# Patient Record
Sex: MALE | Race: WHITE | NOT HISPANIC OR LATINO | Employment: UNEMPLOYED
[De-identification: names, ages, dates, MRNs, and addresses within clinical notes are randomized per-mention and may not be internally consistent; named-entity substitution may affect disease eponyms.]

---

## 2017-11-10 ENCOUNTER — RECORDS - HEALTHEAST (OUTPATIENT)
Dept: ADMINISTRATIVE | Facility: OTHER | Age: 22
End: 2017-11-10

## 2017-11-13 ENCOUNTER — RECORDS - HEALTHEAST (OUTPATIENT)
Dept: ADMINISTRATIVE | Facility: OTHER | Age: 22
End: 2017-11-13

## 2017-11-20 ENCOUNTER — RECORDS - HEALTHEAST (OUTPATIENT)
Dept: ADMINISTRATIVE | Facility: OTHER | Age: 22
End: 2017-11-20

## 2017-11-22 ENCOUNTER — OFFICE VISIT - HEALTHEAST (OUTPATIENT)
Dept: INTERNAL MEDICINE | Facility: CLINIC | Age: 22
End: 2017-11-22

## 2017-11-22 DIAGNOSIS — R33.9 INCOMPLETE EMPTYING OF BLADDER: ICD-10-CM

## 2017-11-22 DIAGNOSIS — F34.1 DYSTHYMIC DISORDER: ICD-10-CM

## 2017-11-22 DIAGNOSIS — R79.89 ABNORMAL LFTS: ICD-10-CM

## 2017-11-22 DIAGNOSIS — E55.9 VITAMIN D DEFICIENCY: ICD-10-CM

## 2017-11-22 DIAGNOSIS — N32.9: ICD-10-CM

## 2017-11-22 ASSESSMENT — MIFFLIN-ST. JEOR: SCORE: 1687.99

## 2017-11-23 LAB — ANA SER QL: 0.3 U

## 2017-11-24 ENCOUNTER — COMMUNICATION - HEALTHEAST (OUTPATIENT)
Dept: INTERNAL MEDICINE | Facility: CLINIC | Age: 22
End: 2017-11-24

## 2017-11-24 DIAGNOSIS — R79.89 ABNORMAL LFTS: ICD-10-CM

## 2017-11-24 LAB — HEPATITIS B SURFACE ANTIBODY LHE- HISTORICAL: NEGATIVE

## 2017-11-27 ENCOUNTER — COMMUNICATION - HEALTHEAST (OUTPATIENT)
Dept: INTERNAL MEDICINE | Facility: CLINIC | Age: 22
End: 2017-11-27

## 2017-11-27 DIAGNOSIS — R79.89 ABNORMAL LFTS: ICD-10-CM

## 2017-11-29 ENCOUNTER — RECORDS - HEALTHEAST (OUTPATIENT)
Dept: ADMINISTRATIVE | Facility: OTHER | Age: 22
End: 2017-11-29

## 2017-12-06 ENCOUNTER — RECORDS - HEALTHEAST (OUTPATIENT)
Dept: ADMINISTRATIVE | Facility: OTHER | Age: 22
End: 2017-12-06

## 2017-12-13 ENCOUNTER — RECORDS - HEALTHEAST (OUTPATIENT)
Dept: ADMINISTRATIVE | Facility: OTHER | Age: 22
End: 2017-12-13

## 2017-12-27 ENCOUNTER — RECORDS - HEALTHEAST (OUTPATIENT)
Dept: ADMINISTRATIVE | Facility: OTHER | Age: 22
End: 2017-12-27

## 2018-01-17 ENCOUNTER — RECORDS - HEALTHEAST (OUTPATIENT)
Dept: ADMINISTRATIVE | Facility: OTHER | Age: 23
End: 2018-01-17

## 2018-01-31 ENCOUNTER — RECORDS - HEALTHEAST (OUTPATIENT)
Dept: ADMINISTRATIVE | Facility: OTHER | Age: 23
End: 2018-01-31

## 2018-04-29 ENCOUNTER — RECORDS - HEALTHEAST (OUTPATIENT)
Dept: ADMINISTRATIVE | Facility: OTHER | Age: 23
End: 2018-04-29

## 2018-04-29 ENCOUNTER — HOSPITAL ENCOUNTER (INPATIENT)
Facility: CLINIC | Age: 23
LOS: 1 days | Discharge: HOME | DRG: 885 | End: 2018-05-01
Attending: EMERGENCY MEDICINE | Admitting: PSYCHIATRY & NEUROLOGY
Payer: COMMERCIAL

## 2018-04-29 ENCOUNTER — APPOINTMENT (OUTPATIENT)
Dept: CT IMAGING | Facility: CLINIC | Age: 23
DRG: 885 | End: 2018-04-29
Attending: EMERGENCY MEDICINE
Payer: COMMERCIAL

## 2018-04-29 DIAGNOSIS — R41.82 ALTERED MENTAL STATUS, UNSPECIFIED ALTERED MENTAL STATUS TYPE: ICD-10-CM

## 2018-04-29 DIAGNOSIS — F10.129 HANGOVER (H): ICD-10-CM

## 2018-04-29 DIAGNOSIS — F10.920 ALCOHOLIC INTOXICATION WITHOUT COMPLICATION (H): ICD-10-CM

## 2018-04-29 LAB
ALBUMIN SERPL-MCNC: 4.3 G/DL (ref 3.4–5)
ALBUMIN UR-MCNC: NEGATIVE MG/DL
ALP SERPL-CCNC: 78 U/L (ref 40–150)
ALT SERPL W P-5'-P-CCNC: 24 U/L (ref 0–70)
AMPHETAMINES UR QL SCN: NEGATIVE
ANION GAP SERPL CALCULATED.3IONS-SCNC: 14 MMOL/L (ref 3–14)
APPEARANCE UR: CLEAR
AST SERPL W P-5'-P-CCNC: 26 U/L (ref 0–45)
BARBITURATES UR QL: NEGATIVE
BASOPHILS # BLD AUTO: 0 10E9/L (ref 0–0.2)
BASOPHILS NFR BLD AUTO: 0.1 %
BENZODIAZ UR QL: NEGATIVE
BILIRUB SERPL-MCNC: 0.4 MG/DL (ref 0.2–1.3)
BILIRUB UR QL STRIP: NEGATIVE
BUN SERPL-MCNC: 8 MG/DL (ref 7–30)
CALCIUM SERPL-MCNC: 8.4 MG/DL (ref 8.5–10.1)
CANNABINOIDS UR QL SCN: NEGATIVE
CHLORIDE SERPL-SCNC: 103 MMOL/L (ref 94–109)
CO2 SERPL-SCNC: 21 MMOL/L (ref 20–32)
COCAINE UR QL: NEGATIVE
COLOR UR AUTO: ABNORMAL
CREAT SERPL-MCNC: 0.96 MG/DL (ref 0.66–1.25)
DIFFERENTIAL METHOD BLD: NORMAL
EOSINOPHIL # BLD AUTO: 0.1 10E9/L (ref 0–0.7)
EOSINOPHIL NFR BLD AUTO: 0.7 %
ERYTHROCYTE [DISTWIDTH] IN BLOOD BY AUTOMATED COUNT: 12.8 % (ref 10–15)
ETHANOL SERPL-MCNC: 0.14 G/DL
ETHANOL UR QL SCN: POSITIVE
GFR SERPL CREATININE-BSD FRML MDRD: >90 ML/MIN/1.7M2
GLUCOSE SERPL-MCNC: 87 MG/DL (ref 70–99)
GLUCOSE UR STRIP-MCNC: NEGATIVE MG/DL
HCT VFR BLD AUTO: 42.7 % (ref 40–53)
HGB BLD-MCNC: 15.1 G/DL (ref 13.3–17.7)
HGB UR QL STRIP: NEGATIVE
IMM GRANULOCYTES # BLD: 0 10E9/L (ref 0–0.4)
IMM GRANULOCYTES NFR BLD: 0.3 %
KETONES UR STRIP-MCNC: NEGATIVE MG/DL
LEUKOCYTE ESTERASE UR QL STRIP: NEGATIVE
LYMPHOCYTES # BLD AUTO: 1.6 10E9/L (ref 0.8–5.3)
LYMPHOCYTES NFR BLD AUTO: 21.4 %
MCH RBC QN AUTO: 30.8 PG (ref 26.5–33)
MCHC RBC AUTO-ENTMCNC: 35.4 G/DL (ref 31.5–36.5)
MCV RBC AUTO: 87 FL (ref 78–100)
MONOCYTES # BLD AUTO: 0.7 10E9/L (ref 0–1.3)
MONOCYTES NFR BLD AUTO: 9.4 %
NEUTROPHILS # BLD AUTO: 5 10E9/L (ref 1.6–8.3)
NEUTROPHILS NFR BLD AUTO: 68.1 %
NITRATE UR QL: NEGATIVE
NRBC # BLD AUTO: 0 10*3/UL
NRBC BLD AUTO-RTO: 0 /100
OPIATES UR QL SCN: NEGATIVE
PH UR STRIP: 5.5 PH (ref 5–7)
PLATELET # BLD AUTO: 221 10E9/L (ref 150–450)
POTASSIUM SERPL-SCNC: 3.8 MMOL/L (ref 3.4–5.3)
PROT SERPL-MCNC: 7.8 G/DL (ref 6.8–8.8)
RBC # BLD AUTO: 4.91 10E12/L (ref 4.4–5.9)
SODIUM SERPL-SCNC: 138 MMOL/L (ref 133–144)
SOURCE: ABNORMAL
SP GR UR STRIP: 1 (ref 1–1.03)
UROBILINOGEN UR STRIP-MCNC: NORMAL MG/DL (ref 0–2)
WBC # BLD AUTO: 7.3 10E9/L (ref 4–11)

## 2018-04-29 PROCEDURE — 99284 EMERGENCY DEPT VISIT MOD MDM: CPT | Mod: Z6 | Performed by: EMERGENCY MEDICINE

## 2018-04-29 PROCEDURE — 80307 DRUG TEST PRSMV CHEM ANLYZR: CPT | Performed by: FAMILY MEDICINE

## 2018-04-29 PROCEDURE — 81003 URINALYSIS AUTO W/O SCOPE: CPT | Performed by: EMERGENCY MEDICINE

## 2018-04-29 PROCEDURE — 70450 CT HEAD/BRAIN W/O DYE: CPT

## 2018-04-29 PROCEDURE — 99285 EMERGENCY DEPT VISIT HI MDM: CPT | Mod: 25

## 2018-04-29 PROCEDURE — 90791 PSYCH DIAGNOSTIC EVALUATION: CPT

## 2018-04-29 PROCEDURE — 80320 DRUG SCREEN QUANTALCOHOLS: CPT | Performed by: EMERGENCY MEDICINE

## 2018-04-29 PROCEDURE — 80320 DRUG SCREEN QUANTALCOHOLS: CPT | Performed by: FAMILY MEDICINE

## 2018-04-29 PROCEDURE — 80053 COMPREHEN METABOLIC PANEL: CPT | Performed by: EMERGENCY MEDICINE

## 2018-04-29 PROCEDURE — 85025 COMPLETE CBC W/AUTO DIFF WBC: CPT | Performed by: EMERGENCY MEDICINE

## 2018-04-29 ASSESSMENT — ENCOUNTER SYMPTOMS
VOMITING: 0
SHORTNESS OF BREATH: 0
ABDOMINAL PAIN: 0

## 2018-04-29 NOTE — IP AVS SNAPSHOT
[address] [address]    Phone:  [phone]                                       After Visit Summary   4/29/2018    Fish Dickerson    MRN: [id number]           After Visit Summary Signature Page     I have received my discharge instructions, and my questions have been answered. I have discussed any challenges I see with this plan with the nurse or doctor.    ..........................................................................................................................................  Patient/Patient Representative Signature      ..........................................................................................................................................  Patient Representative Print Name and Relationship to Patient    ..................................................               ................................................  Date                                            Time    ..........................................................................................................................................  Reviewed by Signature/Title    ...................................................              ..............................................  Date                                                            Time

## 2018-04-29 NOTE — IP AVS SNAPSHOT
MRN:[id number]                      After Visit Summary   4/29/2018    Fish Dickerson    MRN: [id number]           Thank you!     Thank you for choosing [facility] for your care. Our goal is always to provide you with excellent care.        Patient Information     Date Of Birth          1995        Designated Caregiver       Most Recent Value    Caregiver    Will someone help with your care after discharge? yes    [place] [facility] [place]      About your hospital stay     You were admitted on:  April 30, 2018 You last received care in the:   [facility]    You were discharged on:  May 1, 2018       Who to Call     For medical emergencies, please call 911.  For non-urgent questions about your medical care, please call your primary care provider or clinic, [phone]          Attending Provider     Provider Specialty    Adrian Dey MD Emergency Medicine    Morris, Octavio Le MD Psychiatry    [facility], Billy Aiken MD Psychiatry    Hector, Bridger Burch MD Psychiatry       Primary Care Provider Office Phone # Fax #    Kathryn Santo [phone]      Further instructions from your care team        Behavioral Discharge Planning and Instructions      Summary:  You were admitted on 4/29/2018  due to abuse of DXM and alcohol.  You were treated by Dr Bridger Young MD and discharged on May 2, 2018 [facility]    Principal Diagnosis:     Health Care Follow-up Appointments:   Psychiatrist Bijal Castro - Next Appointment on Wed May 9th at 10am  [address] [address]  [phone]  [phone]    DBT Therapy at [facility] ([facility])    You plan to get a chemical dependency assessment on your own and re-enter a treatment program.                   Attend all scheduled appointments with your outpatient providers. Call at least 24 hours in advance  "if you need to reschedule an appointment to ensure continued access to your outpatient providers.   Major Treatments, Procedures and Findings:  You were provided with: a psychiatric assessment, assessed for medical stability, medication evaluation and/or management, group therapy and milieu management    Symptoms to Report: feeling more aggressive, increased confusion, losing more sleep, mood getting worse or thoughts of suicide    Early warning signs can include: increased depression or anxiety sleep disturbances increased thoughts or behaviors of suicide or self-harm     Safety and Wellness:  Take all medicines as directed.  Make no changes unless your doctor suggests them.      Follow treatment recommendations.  Refrain from alcohol and non-prescribed drugs.  If there is a concern for safety, call 911.    Resources:   Crisis Intervention: 128.138.9798 or [phone] (TTY: 870.261.9638).  Call anytime for help.  [place] on Mental Illness (www.mn.jey.org): 974.177.5801 or 673-971-1143.  Alcoholics Anonymous (www.alcoholics-anonymous.org): Check your phone book for your local chapter.    The treatment team has appreciated the opportunity to work with you.     If you have any questions or concerns our unit number is [phone].        Pending Results     No orders found from 4/27/2018 to 4/30/2018.            Admission Information     Date & Time Provider Department Dept. Phone    4/29/2018 Bridger Young MD [facility] [phone]      Your Vitals Were     Blood Pressure Pulse Temperature Respirations Height Weight    116/56 (BP Location: Left arm) 73 97.3  F (36.3  C) (Oral) 16 1.803 m (5' 11\") 64.4 kg (142 lb)    Pulse Oximetry BMI (Body Mass Index)                97% 19.8 kg/m2          MyChart Information     [employer] lets you send messages to your doctor, view your test results, renew your prescriptions, schedule appointments and more. To sign up, go to [email or web address] . Click on \"Log " "in\" on the left side of the screen, which will take you to the Welcome page. Then click on \"Sign up Now\" on the right side of the page.     You will be asked to enter the access code listed below, as well as some personal information. Please follow the directions to create your username and password.     Your access code is: [email or web address]  Expires: 2018  1:02 PM     Your access code will  in 90 days. If you need help or a new code, please call your [place] clinic or [phone].        Care EveryWhere ID     This is your Care EveryWhere ID. This could be used by other organizations to access your [place] medical records  [id number]        Equal Access to Services     RENNY GOLD : Barrera Tracey, wayash mendes, qacaleb kaalmamily blackburn, sylvia gonzalez . So [facility] [phone].    ATENCIÓN: Si habla español, tiene a montez disposición servicios gratuitos de asistencia lingüística. Llame al [phone].    We comply with applicable federal civil rights laws and [place] laws. We do not discriminate on the basis of race, color, national origin, age, disability, sex, sexual orientation, or gender identity.               Review of your medicines      CONTINUE these medicines which have NOT CHANGED        Dose / Directions    AMBIEN PO        Dose:  10 mg   Take 10 mg by mouth At Bedtime   Refills:  0       PROZAC PO        Dose:  40 mg   Take 40 mg by mouth daily   Refills:  0       * SEROQUEL PO        Dose:  300 mg   Take 300 mg by mouth At Bedtime   Refills:  0       * SEROQUEL PO        Dose:  200 mg   Take 200 mg by mouth 2 times daily B&C reports that pt takes it in the AM and 1200.   Refills:  0       * Notice:  This list has 2 medication(s) that are the same as other medications prescribed for you. Read the directions carefully, and ask your doctor or other care provider to review them with you.             Protect others around you: Learn how to safely use, " store and throw away your medicines at www.disposemymeds.org.             Medication List: This is a list of all your medications and when to take them. Check marks below indicate your daily home schedule. Keep this list as a reference.      Medications           Morning Afternoon Evening Bedtime As Needed    AMBIEN PO   Take 10 mg by mouth At Bedtime   Last time this was given:  10 mg on 4/30/2018  9:18 PM                                PROZAC PO   Take 40 mg by mouth daily   Last time this was given:  40 mg on 5/1/2018  9:35 AM                                * SEROQUEL PO   Take 300 mg by mouth At Bedtime   Last time this was given:  200 mg on 5/1/2018 11:49 AM                                * SEROQUEL PO   Take 200 mg by mouth 2 times daily B&C reports that pt takes it in the AM and 1200.   Last time this was given:  200 mg on 5/1/2018 11:49 AM                                * Notice:  This list has 2 medication(s) that are the same as other medications prescribed for you. Read the directions carefully, and ask your doctor or other care provider to review them with you.

## 2018-04-29 NOTE — ED NOTES
Bed: ED14  Expected date: 4/29/18  Expected time: 6:00 PM  Means of arrival:   Comments:  Tye [address]  22y M  Psych, AMS
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
## 2018-04-30 PROBLEM — R41.89 DISORGANIZED THOUGHT PROCESS: Status: ACTIVE | Noted: 2018-04-30

## 2018-04-30 PROCEDURE — 25000132 ZZH RX MED GY IP 250 OP 250 PS 637: Performed by: EMERGENCY MEDICINE

## 2018-04-30 PROCEDURE — 99222 1ST HOSP IP/OBS MODERATE 55: CPT | Mod: AI | Performed by: PSYCHIATRY & NEUROLOGY

## 2018-04-30 PROCEDURE — 12400007 ZZH R&B MH INTERMEDIATE UMMC

## 2018-04-30 PROCEDURE — 99207 ZZC CDG-MDM COMPONENT: MEETS LOW - DOWN CODED: CPT | Performed by: PSYCHIATRY & NEUROLOGY

## 2018-04-30 PROCEDURE — 25000132 ZZH RX MED GY IP 250 OP 250 PS 637: Performed by: PSYCHIATRY & NEUROLOGY

## 2018-04-30 PROCEDURE — HZ2ZZZZ DETOXIFICATION SERVICES FOR SUBSTANCE ABUSE TREATMENT: ICD-10-PCS | Performed by: OBSTETRICS & GYNECOLOGY

## 2018-04-30 RX ORDER — QUETIAPINE FUMARATE 200 MG/1
200 TABLET, FILM COATED ORAL 2 TIMES DAILY
Status: DISCONTINUED | OUTPATIENT
Start: 2018-04-30 | End: 2018-05-01 | Stop reason: HOSPADM

## 2018-04-30 RX ORDER — ZOLPIDEM TARTRATE 10 MG/1
10 TABLET ORAL AT BEDTIME
Status: DISCONTINUED | OUTPATIENT
Start: 2018-04-30 | End: 2018-05-01 | Stop reason: HOSPADM

## 2018-04-30 RX ORDER — HYDROXYZINE HYDROCHLORIDE 50 MG/1
50 TABLET, FILM COATED ORAL EVERY 4 HOURS PRN
Status: DISCONTINUED | OUTPATIENT
Start: 2018-04-30 | End: 2018-05-01 | Stop reason: HOSPADM

## 2018-04-30 RX ORDER — ATENOLOL 25 MG/1
50 TABLET ORAL DAILY PRN
Status: DISCONTINUED | OUTPATIENT
Start: 2018-04-30 | End: 2018-05-01 | Stop reason: HOSPADM

## 2018-04-30 RX ORDER — LORAZEPAM 1 MG/1
1-4 TABLET ORAL EVERY 30 MIN PRN
Status: DISCONTINUED | OUTPATIENT
Start: 2018-04-30 | End: 2018-05-01 | Stop reason: CLARIF

## 2018-04-30 RX ORDER — QUETIAPINE FUMARATE 300 MG/1
300 TABLET, FILM COATED ORAL AT BEDTIME
Status: DISCONTINUED | OUTPATIENT
Start: 2018-04-30 | End: 2018-05-01 | Stop reason: HOSPADM

## 2018-04-30 RX ORDER — OLANZAPINE 10 MG/1
10 TABLET, ORALLY DISINTEGRATING ORAL ONCE
Status: COMPLETED | OUTPATIENT
Start: 2018-04-30 | End: 2018-04-30

## 2018-04-30 RX ORDER — HYDROXYZINE HYDROCHLORIDE 25 MG/1
25 TABLET, FILM COATED ORAL EVERY 4 HOURS PRN
Status: DISCONTINUED | OUTPATIENT
Start: 2018-04-30 | End: 2018-04-30

## 2018-04-30 RX ADMIN — FLUOXETINE 40 MG: 20 CAPSULE ORAL at 12:55

## 2018-04-30 RX ADMIN — OLANZAPINE 10 MG: 10 TABLET, ORALLY DISINTEGRATING ORAL at 02:25

## 2018-04-30 RX ADMIN — HYDROXYZINE HYDROCHLORIDE 25 MG: 25 TABLET, FILM COATED ORAL at 09:58

## 2018-04-30 RX ADMIN — ZOLPIDEM TARTRATE 10 MG: 10 TABLET, FILM COATED ORAL at 21:18

## 2018-04-30 RX ADMIN — QUETIAPINE FUMARATE 300 MG: 300 TABLET ORAL at 21:18

## 2018-04-30 RX ADMIN — QUETIAPINE FUMARATE 200 MG: 200 TABLET ORAL at 12:56

## 2018-04-30 NOTE — PROGRESS NOTES
Pt has not attended scheduled occupational therapy sessions. Encourage attendance and participation. Pt will be given self-assessment form, and OT staff will explain the purpose of including them in their treatment plan and offer options for meeting their needs.
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
## 2018-04-30 NOTE — PROGRESS NOTES
04/30/18 0157   Patient Belongings   Did you bring any home meds/supplements to the hospital?  No   Patient Belongings clothing;cell phone/electronics;glasses;shoes;wallet;other (see comments)   Disposition of Belongings Locker   Belongings Search Yes   Clothing Search Yes   Second Staff Jay Jay   General Info Comment Patient came in with black boot, belt, a lock on a chain, sock, black pant, black shirt, black jacket, cell phone+ ear phones, sun glasses, wallet with ID and pin bottons.  There was no cash and no money cards in belongings.   A               Admission:  I am responsible for any personal items that are not sent to the safe or pharmacy.  [facility] is not responsible for loss, theft or damage of any property in my possession.    Signature:  _________________________________ Date: _______  Time: _____                                              Staff Signature:  ____________________________ Date: ________  Time: _____      2nd Staff person, if patient is unable/unwilling to sign:    Signature: ________________________________ Date: ________  Time: _____     Discharge:  [facility] has returned all of my personal belongings:    Signature: _________________________________ Date: ________  Time: _____                                          Staff Signature:  ____________________________ Date: ________  Time: _____
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
## 2018-04-30 NOTE — ED NOTES
1857 Pt had heavy chain necklace secured with padlock and did not have a key.  Decision made by MD Dey that necklace must be removed for pt safety.  Attempt made to remove with pliers but was not successful.  Code 21 called to remove chain with bolt cutters.  Team arrived, but with assistance of psych associate, padlock was disabled. Code team left, pt remained calm throughout process.
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
## 2018-04-30 NOTE — PROGRESS NOTES
"Staff reports patient is in his bathroom throwing up. Upon assessment, patient denies feeling nauseous \"Oh, it's nothing.\" He then went to bed and is presently resting peacefully in his bed. Will continue to monitor and to provide for his safety and comfort as needed  "

## 2018-04-30 NOTE — ED NOTES
Patient requested writer call his Group Home, [facility] ([phone]) and tell them he was in Emergency Room so they would not worry about him. Called and told staff what patient requested. No other information was given.

## 2018-04-30 NOTE — PLAN OF CARE
Problem: Patient Care Overview  Goal: Team Discussion  Team Plan:   BEHAVIORAL TEAM DISCUSSION    Participants: Dr. Bridger Young; Marce WOOTEN; Destiny Bloom RN; Naty Isaacs OTR/L    Progress: The patient has been asking to leave today. Reports he feels better.      Continued Stay Criteria/Rationale: Needs more stabilization just admitted last evening.    Medical/Physical: See Consult    Precautions:   Behavioral Orders   Procedures     Code 1 - Restrict to Unit     Routine Programming     As clinically indicated     Seizure precautions     Status 15     Every 15 minutes.     Plan: The patient has been encouraged to attend all programming.  Restarting medications.  CTC to ensure he has a comprehensive care plan in place at discharge back to his board & lodge in [place].    Rationale for change in precautions or plan: No changes
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
## 2018-04-30 NOTE — ED NOTES
"Patient is becoming increasingly agitated in room due to long wait time. Patient said to staff \" as soon as I get out of here I'm going to kill myself\". Verbal deescoilation  techniques and reassurance utilized by staff.    "

## 2018-04-30 NOTE — ED NOTES
Patient was placed on 72 hour hold. Hold rationale explained and paperwork given to patient. Patient requested papers be put with his personal belongings.

## 2018-05-01 ENCOUNTER — RECORDS - HEALTHEAST (OUTPATIENT)
Dept: ADMINISTRATIVE | Facility: OTHER | Age: 23
End: 2018-05-01

## 2018-05-01 VITALS
HEIGHT: 71 IN | WEIGHT: 142 LBS | BODY MASS INDEX: 19.88 KG/M2 | HEART RATE: 73 BPM | TEMPERATURE: 97.3 F | SYSTOLIC BLOOD PRESSURE: 116 MMHG | DIASTOLIC BLOOD PRESSURE: 56 MMHG | OXYGEN SATURATION: 97 % | RESPIRATION RATE: 16 BRPM

## 2018-05-01 PROCEDURE — 99238 HOSP IP/OBS DSCHRG MGMT 30/<: CPT | Performed by: PSYCHIATRY & NEUROLOGY

## 2018-05-01 PROCEDURE — 25000132 ZZH RX MED GY IP 250 OP 250 PS 637: Performed by: PSYCHIATRY & NEUROLOGY

## 2018-05-01 RX ADMIN — FLUOXETINE 40 MG: 20 CAPSULE ORAL at 09:35

## 2018-05-01 RX ADMIN — QUETIAPINE FUMARATE 200 MG: 200 TABLET ORAL at 11:49

## 2018-05-01 RX ADMIN — QUETIAPINE FUMARATE 200 MG: 200 TABLET ORAL at 09:35

## 2018-05-01 NOTE — PLAN OF CARE
"Problem: Psychotic Symptoms  Goal: Psychotic Symptoms  Signs and symptoms of listed problems will be absent or manageable.   Outcome: Adequate for Discharge Date Met: 05/01/18  Pt was discharged via cab voucher to [facility].  Pt verbalized readiness for discharge. Pt verbalized understanding of discharge instructions including discharge medications and follow up plans.  Pt was excited about discharge. Pt denies SI and SIB. Pt ate breakfast and lunch and was medication compliant. Pt denies depression and states he has \"a little anxiety\" but that it was due to discharging. Pt denies psychotic symptoms and states his concentration is \"fine\" Pt denies racing thoughts and he is hopeful.  No signs of alcohol withdrawal noticed. Pt was escorted off the unit to his cab with his personal belongings. No medications were sent with since no medication changes occurred while in the hospital.        "

## 2018-05-01 NOTE — PROGRESS NOTES
"   04/30/18 1500   Behavioral Health   Hallucinations denies / not responding to hallucinations   Thinking poor concentration;distractable   Orientation place: oriented;person: oriented;date: oriented   Memory baseline memory   Insight poor   Judgement impaired   Eye Contact at examiner   Affect other (see comments)  (anxious)   Mood anxious   Physical Appearance/Attire untidy   Hygiene other (see comment)  (fair)   Suicidality other (see comments)  (denies currently)   1. Wish to be Dead No   2. Non-Specific Active Suicidal Thoughts  No   Self Injury other (see comment)  (denies currently)   Elopement Statements about wanting to leave   Activity restless   Speech clear;coherent   Medication Sensitivity no stated side effects   Psychomotor / Gait balanced;steady     Pt had an unremarkable shift. Mood and affect were anxious. Pt denied SI/SIB, as well as paranoia/hallucinations/psychotic symptoms. In regards to the events that led up to his hospitalization, Pt described that he \"lost his mind\" and stated he should have not consumed alcohol which he believes negatively interacted with the medications he is taking. Hygiene is fair. No behavioral concerns on the unit at this time.   "

## 2018-05-14 NOTE — DISCHARGE SUMMARY
[facility], [place]   Psychiatric Discharge Summary      Fish Dickerson MRN# [id number]   Age: 22 year old YOB: 1995     Date of Admission:  4/29/2018  Date of Discharge:  05/01/18  Admitting Physician:  Bridger Young MD  Discharge Physician:  Bridger Young MD         Summary/Hospital Course/Disposition:   Reason for Hospitalization: Fish is a 22 year old male with history of depression, anxiety, ADHD, ODD, polysubstance abuse, cognitive disorder, and possible Bipolar Disorder who was admitted on a 72 hour hold for exhibiting elevated, disorganized, altered behaviors in the community.      Hospital Course:   (Initial Assessment 4/30): Fish's recent altered mental status (with disorganized/bizarre behavior) in the community could be precipitated by his alcohol relapse combined with dextromethorphan use (which can cause users to dissociate and act altered). Other precipitating/perpetuating factors include his underlying tammi/psychosis, anxiety, and neurocognitive disorder. He currently appears to be anxious about wanting to be discharged. Before we can do that we need to have documentation (from his current hospitalization), that he is medication compliant (recent medication list was obtained from staff at his board and care facility), stable in his symptoms (relatively speaking enough to function), assessed for any danger risk, and for any lingering withdrawal concerns. Regarding withdrawal, his MSSA's have been in the 6's today. His board and care facility ([facility]) sounds like they're willing to take him back when stable. Patient plans to follow up with SIL hussein at [facility].     Day of Discharge (5/1): Patient denied psychiatric symptoms, he felt subjective improvement in his mood and had an improved future outlook. He was able to look back on his behavior and attribute it to his alcohol use. He was compliant on this medications, and denied side  effects. He was still somewhat disorganized, but much relative improvement compared to before. No remaining withdrawal symptoms. He was future oriented. He requested discharge; I did not have any reason to hold him, thus I discharged him with medications.          DIagnoses:   Bipolar 1 Disorder with psychosis  Generalized Anxiety Disorder  Panic Disorder   Neurocognitive Disorder (unclear severity)   Alcohol Use Disorder  Dextromethorphan Use Disorder   Hx of ADHD  ODD         Labs:   No results found for this or any previous visit (from the past 24 hour(s)).         Consults:   None            Discharge Medications:        Review of your medicines      CONTINUE these medicines which have NOT CHANGED       Dose / Directions    AMBIEN PO        Dose:  10 mg   Take 10 mg by mouth At Bedtime   Refills:  0       PROZAC PO        Dose:  40 mg   Take 40 mg by mouth daily   Refills:  0       * SEROQUEL PO        Dose:  300 mg   Take 300 mg by mouth At Bedtime   Refills:  0       * SEROQUEL PO        Dose:  200 mg   Take 200 mg by mouth 2 times daily B&C reports that pt takes it in the AM and 1200.   Refills:  0       * Notice:  This list has 2 medication(s) that are the same as other medications prescribed for you. Read the directions carefully, and ask your doctor or other care provider to review them with you.               Mental Status Examination:   Appearance: Young appearing white male. Somewhat mild grooming/hygiene. No acute distress.   Attitude:  Anxious, somewhat intrusive but redirectable. Mildly cooperative.   Eye Contact: Adequate   Mood:  Anxious   Affect:  mood congruent  Speech:  Becomes loud at times, slowness in prosody   Language: fluent and intact in English  Psychomotor, Gait, Musculoskeletal:  Some restlessness   Throught Process:  Disorganized at times   Associations:  Some mild loosening.    Thought Content:  Centered on wanting to be discharged. Denies current SI/HI//AH/VH.   Insight:  Poor    Judgement:  Poor   Oriented to:  Person, place, time   Attention Span and Concentration:  Distractible   Recent and Remote Memory:  Deficits   Fund of Knowledge:  Below Average          Discharge Plan:   No discharge procedures on file.    - Patient discharged back to home with medications     Bridger Young MD  [facility]

## 2018-07-09 ENCOUNTER — HOSPITAL ENCOUNTER (EMERGENCY)
Facility: CLINIC | Age: 23
Discharge: HOME | End: 2018-07-09
Attending: FAMILY MEDICINE | Admitting: FAMILY MEDICINE
Payer: COMMERCIAL

## 2018-07-09 ENCOUNTER — RECORDS - HEALTHEAST (OUTPATIENT)
Dept: ADMINISTRATIVE | Facility: OTHER | Age: 23
End: 2018-07-09

## 2018-07-09 ENCOUNTER — ALLIED HEALTH/NURSE VISIT (OUTPATIENT)
Dept: FAMILY MEDICINE | Facility: CLINIC | Age: 23
End: 2018-07-09
Payer: COMMERCIAL

## 2018-07-09 VITALS
HEART RATE: 83 BPM | DIASTOLIC BLOOD PRESSURE: 75 MMHG | OXYGEN SATURATION: 92 % | SYSTOLIC BLOOD PRESSURE: 106 MMHG | RESPIRATION RATE: 20 BRPM

## 2018-07-09 DIAGNOSIS — E87.6 HYPOKALEMIA: ICD-10-CM

## 2018-07-09 DIAGNOSIS — R45.1 AGITATION REQUIRING SEDATION PROTOCOL: Primary | ICD-10-CM

## 2018-07-09 DIAGNOSIS — S41.112A LACERATION OF ARM, LEFT, INITIAL ENCOUNTER: ICD-10-CM

## 2018-07-09 DIAGNOSIS — F19.10 POLYSUBSTANCE ABUSE (H): ICD-10-CM

## 2018-07-09 DIAGNOSIS — S41.112A LACERATION OF LEFT UPPER EXTREMITY, INITIAL ENCOUNTER: Primary | ICD-10-CM

## 2018-07-09 DIAGNOSIS — R45.6 VIOLENT BEHAVIOR: ICD-10-CM

## 2018-07-09 DIAGNOSIS — F10.920 ALCOHOLIC INTOXICATION WITHOUT COMPLICATION (H): ICD-10-CM

## 2018-07-09 DIAGNOSIS — F19.921: ICD-10-CM

## 2018-07-09 DIAGNOSIS — F91.9 DISRUPTIVE BEHAVIOR DISORDER: ICD-10-CM

## 2018-07-09 LAB
ALBUMIN SERPL-MCNC: 3.6 G/DL (ref 3.4–5)
ALP SERPL-CCNC: 97 U/L (ref 40–150)
ALT SERPL W P-5'-P-CCNC: 31 U/L (ref 0–70)
AMPHETAMINES UR QL SCN: POSITIVE
ANION GAP SERPL CALCULATED.3IONS-SCNC: 6 MMOL/L (ref 3–14)
APAP SERPL-MCNC: <2 MG/L (ref 10–20)
AST SERPL W P-5'-P-CCNC: 41 U/L (ref 0–45)
BARBITURATES UR QL: NEGATIVE
BASOPHILS # BLD AUTO: 0 10E9/L (ref 0–0.2)
BASOPHILS NFR BLD AUTO: 0.3 %
BENZODIAZ UR QL: NEGATIVE
BILIRUB SERPL-MCNC: 0.4 MG/DL (ref 0.2–1.3)
BUN SERPL-MCNC: 8 MG/DL (ref 7–30)
CALCIUM SERPL-MCNC: 8 MG/DL (ref 8.5–10.1)
CANNABINOIDS UR QL SCN: POSITIVE
CHLORIDE SERPL-SCNC: 105 MMOL/L (ref 94–109)
CO2 SERPL-SCNC: 27 MMOL/L (ref 20–32)
COCAINE UR QL: NEGATIVE
CREAT SERPL-MCNC: 0.93 MG/DL (ref 0.66–1.25)
DIFFERENTIAL METHOD BLD: ABNORMAL
EOSINOPHIL # BLD AUTO: 0.1 10E9/L (ref 0–0.7)
EOSINOPHIL NFR BLD AUTO: 0.9 %
ERYTHROCYTE [DISTWIDTH] IN BLOOD BY AUTOMATED COUNT: 12.9 % (ref 10–15)
ETHANOL SERPL-MCNC: 0.29 G/DL
GFR SERPL CREATININE-BSD FRML MDRD: >90 ML/MIN/1.7M2
GLUCOSE SERPL-MCNC: 93 MG/DL (ref 70–99)
HCT VFR BLD AUTO: 38.1 % (ref 40–53)
HGB BLD-MCNC: 13.1 G/DL (ref 13.3–17.7)
IMM GRANULOCYTES # BLD: 0 10E9/L (ref 0–0.4)
IMM GRANULOCYTES NFR BLD: 0.2 %
LYMPHOCYTES # BLD AUTO: 2.2 10E9/L (ref 0.8–5.3)
LYMPHOCYTES NFR BLD AUTO: 22 %
MAGNESIUM SERPL-MCNC: 2.4 MG/DL (ref 1.6–2.3)
MCH RBC QN AUTO: 31 PG (ref 26.5–33)
MCHC RBC AUTO-ENTMCNC: 34.4 G/DL (ref 31.5–36.5)
MCV RBC AUTO: 90 FL (ref 78–100)
MONOCYTES # BLD AUTO: 0.8 10E9/L (ref 0–1.3)
MONOCYTES NFR BLD AUTO: 8.3 %
NEUTROPHILS # BLD AUTO: 6.8 10E9/L (ref 1.6–8.3)
NEUTROPHILS NFR BLD AUTO: 68.3 %
NRBC # BLD AUTO: 0 10*3/UL
NRBC BLD AUTO-RTO: 0 /100
OPIATES UR QL SCN: NEGATIVE
PCP UR QL SCN: NEGATIVE
PLATELET # BLD AUTO: 280 10E9/L (ref 150–450)
POTASSIUM SERPL-SCNC: 3.2 MMOL/L (ref 3.4–5.3)
PROT SERPL-MCNC: 6.7 G/DL (ref 6.8–8.8)
RBC # BLD AUTO: 4.22 10E12/L (ref 4.4–5.9)
SALICYLATES SERPL-MCNC: <2 MG/DL
SODIUM SERPL-SCNC: 138 MMOL/L (ref 133–144)
TROPONIN I SERPL-MCNC: <0.015 UG/L (ref 0–0.04)
WBC # BLD AUTO: 9.9 10E9/L (ref 4–11)

## 2018-07-09 PROCEDURE — 99291 CRITICAL CARE FIRST HOUR: CPT | Mod: 25 | Performed by: FAMILY MEDICINE

## 2018-07-09 PROCEDURE — 80320 DRUG SCREEN QUANTALCOHOLS: CPT | Performed by: FAMILY MEDICINE

## 2018-07-09 PROCEDURE — 80053 COMPREHEN METABOLIC PANEL: CPT | Performed by: FAMILY MEDICINE

## 2018-07-09 PROCEDURE — 93005 ELECTROCARDIOGRAM TRACING: CPT

## 2018-07-09 PROCEDURE — 99207 ZZC NO CHARGE NURSE ONLY: CPT

## 2018-07-09 PROCEDURE — 84484 ASSAY OF TROPONIN QUANT: CPT | Performed by: FAMILY MEDICINE

## 2018-07-09 PROCEDURE — 93010 ELECTROCARDIOGRAM REPORT: CPT | Mod: Z6 | Performed by: FAMILY MEDICINE

## 2018-07-09 PROCEDURE — 80329 ANALGESICS NON-OPIOID 1 OR 2: CPT | Performed by: FAMILY MEDICINE

## 2018-07-09 PROCEDURE — 96375 TX/PRO/DX INJ NEW DRUG ADDON: CPT

## 2018-07-09 PROCEDURE — 96372 THER/PROPH/DIAG INJ SC/IM: CPT

## 2018-07-09 PROCEDURE — 96361 HYDRATE IV INFUSION ADD-ON: CPT

## 2018-07-09 PROCEDURE — 96365 THER/PROPH/DIAG IV INF INIT: CPT

## 2018-07-09 PROCEDURE — 85025 COMPLETE CBC W/AUTO DIFF WBC: CPT | Performed by: FAMILY MEDICINE

## 2018-07-09 PROCEDURE — 80307 DRUG TEST PRSMV CHEM ANLYZR: CPT | Performed by: FAMILY MEDICINE

## 2018-07-09 PROCEDURE — 83735 ASSAY OF MAGNESIUM: CPT | Performed by: FAMILY MEDICINE

## 2018-07-09 PROCEDURE — 25000125 ZZHC RX 250: Performed by: FAMILY MEDICINE

## 2018-07-09 PROCEDURE — 25000128 H RX IP 250 OP 636: Performed by: FAMILY MEDICINE

## 2018-07-09 PROCEDURE — 25000128 H RX IP 250 OP 636

## 2018-07-09 PROCEDURE — 99285 EMERGENCY DEPT VISIT HI MDM: CPT | Mod: 25

## 2018-07-09 RX ORDER — KETAMINE HYDROCHLORIDE 100 MG/ML
75 INJECTION, SOLUTION INTRAMUSCULAR; INTRAVENOUS ONCE
Status: COMPLETED | OUTPATIENT
Start: 2018-07-09 | End: 2018-07-09

## 2018-07-09 RX ORDER — LORAZEPAM 2 MG/ML
INJECTION INTRAMUSCULAR
Status: COMPLETED
Start: 2018-07-09 | End: 2018-07-09

## 2018-07-09 RX ORDER — KETAMINE HYDROCHLORIDE 10 MG/ML
0.75 INJECTION, SOLUTION INTRAMUSCULAR; INTRAVENOUS ONCE
Status: DISCONTINUED | OUTPATIENT
Start: 2018-07-09 | End: 2018-07-09

## 2018-07-09 RX ORDER — DIPHENHYDRAMINE HYDROCHLORIDE 50 MG/ML
50 INJECTION INTRAMUSCULAR; INTRAVENOUS ONCE
Status: COMPLETED | OUTPATIENT
Start: 2018-07-09 | End: 2018-07-09

## 2018-07-09 RX ORDER — POTASSIUM CL/LIDO/0.9 % NACL 10MEQ/0.1L
10 INTRAVENOUS SOLUTION, PIGGYBACK (ML) INTRAVENOUS
Status: DISPENSED | OUTPATIENT
Start: 2018-07-09 | End: 2018-07-09

## 2018-07-09 RX ORDER — OLANZAPINE 10 MG/2ML
10 INJECTION, POWDER, FOR SOLUTION INTRAMUSCULAR ONCE
Status: COMPLETED | OUTPATIENT
Start: 2018-07-09 | End: 2018-07-09

## 2018-07-09 RX ORDER — LORAZEPAM 2 MG/ML
1 INJECTION INTRAMUSCULAR
Status: DISCONTINUED | OUTPATIENT
Start: 2018-07-09 | End: 2018-07-09 | Stop reason: HOSPADM

## 2018-07-09 RX ADMIN — KETAMINE HYDROCHLORIDE 75 MG: 100 INJECTION INTRAMUSCULAR; INTRAVENOUS at 13:10

## 2018-07-09 RX ADMIN — LORAZEPAM 1 MG: 2 INJECTION INTRAMUSCULAR; INTRAVENOUS at 13:23

## 2018-07-09 RX ADMIN — DIPHENHYDRAMINE HYDROCHLORIDE 50 MG: 50 INJECTION, SOLUTION INTRAMUSCULAR; INTRAVENOUS at 13:20

## 2018-07-09 RX ADMIN — OLANZAPINE 10 MG: 10 INJECTION, POWDER, FOR SOLUTION INTRAMUSCULAR at 13:05

## 2018-07-09 RX ADMIN — THIAMINE HYDROCHLORIDE: 100 INJECTION, SOLUTION INTRAMUSCULAR; INTRAVENOUS at 14:30

## 2018-07-09 RX ADMIN — LORAZEPAM 1 MG: 2 INJECTION INTRAMUSCULAR at 13:23

## 2018-07-09 RX ADMIN — Medication 10 MEQ: at 14:30

## 2018-07-09 NOTE — ED NOTES
Physical restraints are removed.  Pt alerts to loud voice and is cooperative.  He is sleeping, breathing ENL and VSS.  Security remains outside door.

## 2018-07-09 NOTE — PROGRESS NOTES
S-(situation): patient is walk in to clinic with female friend.  Has self inflicted wound to left arm.  I saw patient initially in the clinic waiting room and he was swearing loudly and being disruptive.  Moved patient via wheelchair to the procedure room.  Patient was on the way to [place] says friend    B-(background): wound is approximately 3 inches long.  Patient friend states he cut self with a knife today while in the car.  Patient also grinding teeth.  Patient appears to be intoxicated.  I can smell alcohol on his breath.    A-(assessment): altered mental status and cut left forearm    PLAN:  Attempted to clean wound and his arms, but he was quite agitated and would not sit still.  He was swearing.    Myself and Dodie Schafer RN cleaned would as best we could and applied 3x3's and wrapped Kerlix around wound.  Called 911 when patient became hostile and started running around the clinic.  Patient shoved me out of his way  Escorted patient out to the car by myself and Dodie Schafer RN he was brought here in and waited for police to arrive.  Patient remained in car until police arrived.    Wound was wrapped three times and initial wrapping.  Gabriella Ren RN

## 2018-07-09 NOTE — ED AVS SNAPSHOT
[facility] Emergency Department    [address] [address]    Phone:  [phone]    Fax:  [phone]                                       Fish Dickerson   MRN: [id number]    Department:  [facility] Emergency Department   Date of Visit:  7/9/2018           Patient Information     Date Of Birth          1995        Your diagnoses for this visit were:     Disruptive behavior disorder     Polysubstance abuse     Alcoholic intoxication without complication (H)     Acute drug intoxication with delirium (H)     Violent behavior     Laceration of arm, left, initial encounter     Agitation requiring sedation protocol     Hypokalemia        You were seen by Juancho Moran MD and Jay Jay Huizar DO.        Discharge Instructions       I recommend inpatient chemical dependency care.  Opportunity is available at teen and adult [facility] in [facility].   If you have any thoughts of hurting yourself or others, thoughts of suicide please notify friends and family and return to the emergency room.      24 Hour Appointment Hotline       To make an appointment at any [facility], call [phone] ([phone]). If you don't have a family doctor or clinic, we will help you find one. [facility] clinics are conveniently located to serve the needs of you and your family.             Review of your medicines      Our records show that you are taking the medicines listed below. If these are incorrect, please call your family doctor or clinic.        Dose / Directions Last dose taken    AMBIEN PO   Dose:  10 mg        Take 10 mg by mouth At Bedtime   Refills:  0        PROZAC PO   Dose:  40 mg        Take 40 mg by mouth daily   Refills:  0        SEROQUEL PO   Dose:  400 mg        Take 400 mg by mouth At Bedtime   Refills:  0                Procedures and tests performed during your visit     Procedure/Test Number of Times Performed    Acetaminophen level 1    Alcohol ethyl 1     CBC with platelets differential 1    Comprehensive metabolic panel 1    Drug abuse screen urine 1    EKG 12 lead 2    End tidal CO2 2    Laceration repair 1    Magnesium 1    Restraints Violent or Self-Destructive Adult (Age 18 or Older) 2    Salicylate level 1    Troponin I 1      Orders Needing Specimen Collection     None      Pending Results     No orders found from 7/7/2018 to 7/10/2018.            Pending Culture Results     No orders found from 7/7/2018 to 7/10/2018.            Pending Results Instructions     If you had any lab results that were not finalized at the time of your Discharge, you can call the ED Lab Result RN at [phone]. You will be contacted by this team for any positive Lab results or changes in treatment. The nurses are available 7 days a week from 10A to 6:30P.  You can leave a message 24 hours per day and they will return your call.        Test Results From Your Hospital Stay        7/9/2018  1:14 PM      Component Results     Component Value Ref Range & Units Status    WBC 9.9 4.0 - 11.0 10e9/L Final    RBC Count 4.22 (L) 4.4 - 5.9 10e12/L Final    Hemoglobin 13.1 (L) 13.3 - 17.7 g/dL Final    Hematocrit 38.1 (L) 40.0 - 53.0 % Final    MCV 90 78 - 100 fl Final    MCH 31.0 26.5 - 33.0 pg Final    MCHC 34.4 31.5 - 36.5 g/dL Final    RDW 12.9 10.0 - 15.0 % Final    Platelet Count 280 150 - 450 10e9/L Final    Diff Method Automated Method  Final    % Neutrophils 68.3 % Final    % Lymphocytes 22.0 % Final    % Monocytes 8.3 % Final    % Eosinophils 0.9 % Final    % Basophils 0.3 % Final    % Immature Granulocytes 0.2 % Final    Nucleated RBCs 0 0 /100 Final    Absolute Neutrophil 6.8 1.6 - 8.3 10e9/L Final    Absolute Lymphocytes 2.2 0.8 - 5.3 10e9/L Final    Absolute Monocytes 0.8 0.0 - 1.3 10e9/L Final    Absolute Eosinophils 0.1 0.0 - 0.7 10e9/L Final    Absolute Basophils 0.0 0.0 - 0.2 10e9/L Final    Abs Immature Granulocytes 0.0 0 - 0.4 10e9/L Final    Absolute Nucleated RBC 0.0   Final         7/9/2018  1:26 PM      Component Results     Component Value Ref Range & Units Status    Sodium 138 133 - 144 mmol/L Final    Potassium 3.2 (L) 3.4 - 5.3 mmol/L Final    Chloride 105 94 - 109 mmol/L Final    Carbon Dioxide 27 20 - 32 mmol/L Final    Anion Gap 6 3 - 14 mmol/L Final    Glucose 93 70 - 99 mg/dL Final    Urea Nitrogen 8 7 - 30 mg/dL Final    Creatinine 0.93 0.66 - 1.25 mg/dL Final    GFR Estimate >90 >60 mL/min/1.7m2 Final    Non  GFR Calc    GFR Estimate If Black >90 >60 mL/min/1.7m2 Final    African American GFR Calc    Calcium 8.0 (L) 8.5 - 10.1 mg/dL Final    Bilirubin Total 0.4 0.2 - 1.3 mg/dL Final    Albumin 3.6 3.4 - 5.0 g/dL Final    Protein Total 6.7 (L) 6.8 - 8.8 g/dL Final    Alkaline Phosphatase 97 40 - 150 U/L Final    ALT 31 0 - 70 U/L Final    AST 41 0 - 45 U/L Final         7/9/2018  1:39 PM      Component Results     Component Value Ref Range & Units Status    Ethanol g/dL 0.29 (H) <0.01 g/dL Final    Critical Value called to and read back by  SARAH BUENO [facility] 1333 [id number] [facility]           7/9/2018  1:21 PM      Component Results     Component Value Ref Range & Units Status    Salicylate Level <2 mg/dL Final    Therapeutic:        <20  Anti inflammatory:  15-30           7/9/2018  1:35 PM      Component Results     Component Value Ref Range & Units Status    Acetaminophen Level <2 mg/L Final    Therapeutic range: 10-20 mg/L         7/9/2018  2:03 PM      Component Results     Component Value Ref Range & Units Status    Amphetamine Qual Urine Positive (A) NEG^Negative Final    Cutoff for a positive amphetamine is greater than 500 ng/mL. This is an   unconfirmed screening result to be used for medical purposes only.      Barbiturates Qual Urine Negative NEG^Negative Final    Cutoff for a negative barbiturate is 200 ng/mL or less.    Benzodiazepine Qual Urine Negative NEG^Negative Final    Cutoff for a negative benzodiazepine is 200 ng/mL or less.    Cannabinoids  Qual Urine Positive (A) NEG^Negative Final    Cutoff for a positive cannabinoid is greater than 50 ng/mL. This is an   unconfirmed screening result to be used for medical purposes only.      Cocaine Qual Urine Negative NEG^Negative Final    Cutoff for a negative cocaine is 300 ng/mL or less.    Opiates Qualitative Urine Negative NEG^Negative Final    Cutoff for a negative opiate is 300 ng/mL or less.    PCP Qual Urine Negative NEG^Negative Final    Cutoff for a negative PCP is 25 ng/mL or less.         7/9/2018  3:10 PM      Component Results     Component Value Ref Range & Units Status    Magnesium 2.4 (H) 1.6 - 2.3 mg/dL Final         7/9/2018  4:15 PM      Component Results     Component Value Ref Range & Units Status    Troponin I ES <0.015 0.000 - 0.045 ug/L Final    The 99th percentile for upper reference range is 0.045 ug/L.  Troponin values   in the range of 0.045 - 0.120 ug/L may be associated with risks of adverse   clinical events.                  Thank you for choosing [facility]       Thank you for choosing [facility] for your care. Our goal is always to provide you with excellent care. Hearing back from our patients is one way we can continue to improve our services. Please take a few minutes to complete the written survey that you may receive in the mail after you visit with us. Thank you!        Care EveryWhere ID     This is your Care EveryWhere ID. This could be used by other organizations to access your [facility] medical records  [id number]        Equal Access to Services     RENNY GOLD : Barrera Tracey, jennifer mendes, qaybta sylvia sands. So [facility] [phone].    ATENCIÓN: Si habla español, tiene a montez disposición servicios gratuitos de asistencia lingüística. [place] al [phone].    We comply with applicable federal civil rights laws and [place] laws. We do not discriminate on the basis of race, color, national origin, age,  disability, sex, sexual orientation, or gender identity.            After Visit Summary       This is your record. Keep this with you and show to your community pharmacist(s) and doctor(s) at your next visit.

## 2018-07-09 NOTE — ED PROVIDER NOTES
Emergency Department Patient Sign-out       Brief HPI:  This is a 23 year old male signed out to me by Dr. Ninoska Moran.  See initial ED Provider note for details of the presentation.   At the time of signout patient was sleeping from the effects of having received IV Zyprexa.  When he awoke he was calm, approachable.  Denied any intent for self-harm.  Has had no thoughts of suicide.  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  He is in love with his girlfriend whose name is Daniela.  I have personally spoke with Letha who is willing to come and pick him up.  He has a friend who lives in [place] who will take him in.  Currently he is homeless and without employment.  He states his plan is to find employment in [place].  He is aware that he has been approved for inpatient chemical dependency treatment at the [facility] adult [facility] in [facility].  He is at this time declining enrollment.  While I had his girlfriend on the phone he personally gave his plenty of safety to both myself and his girlfriend.      Patient was cleaned of dried blood on his hands and arms.  When a fresh set of clothing.  Was fed.    Exam:   Patient Vitals for the past 24 hrs:   BP Heart Rate Resp SpO2   07/09/18 1830 92/67 82 14 99 %   07/09/18 1800 94/60 - - -   07/09/18 1745 101/50 77 18 98 %   07/09/18 1700 96/47 86 13 100 %   07/09/18 1515 105/61 89 16 100 %   07/09/18 1500 (!) 89/58 111 16 100 %   07/09/18 1445 (!) 88/59 - - -   07/09/18 1430 92/55 98 (!) 0 96 %   07/09/18 1408 - - 11 96 %   07/09/18 1354 - - (!) 0 95 %   07/09/18 1345 - - (!) 0 96 %   07/09/18 1340 - - (!) 7 96 %   07/09/18 1330 - - (!) 31 97 %           ED RESULTS:   Results for orders placed or performed during the hospital encounter of 07/09/18 (from the past 24 hour(s))   CBC with platelets differential     Status: Abnormal    Collection Time: 07/09/18 12:55 PM   Result Value Ref Range    WBC 9.9 4.0 - 11.0 10e9/L    RBC Count 4.22 (L) 4.4  - 5.9 10e12/L    Hemoglobin 13.1 (L) 13.3 - 17.7 g/dL    Hematocrit 38.1 (L) 40.0 - 53.0 %    MCV 90 78 - 100 fl    MCH 31.0 26.5 - 33.0 pg    MCHC 34.4 31.5 - 36.5 g/dL    RDW 12.9 10.0 - 15.0 %    Platelet Count 280 150 - 450 10e9/L    Diff Method Automated Method     % Neutrophils 68.3 %    % Lymphocytes 22.0 %    % Monocytes 8.3 %    % Eosinophils 0.9 %    % Basophils 0.3 %    % Immature Granulocytes 0.2 %    Nucleated RBCs 0 0 /100    Absolute Neutrophil 6.8 1.6 - 8.3 10e9/L    Absolute Lymphocytes 2.2 0.8 - 5.3 10e9/L    Absolute Monocytes 0.8 0.0 - 1.3 10e9/L    Absolute Eosinophils 0.1 0.0 - 0.7 10e9/L    Absolute Basophils 0.0 0.0 - 0.2 10e9/L    Abs Immature Granulocytes 0.0 0 - 0.4 10e9/L    Absolute Nucleated RBC 0.0    Comprehensive metabolic panel     Status: Abnormal    Collection Time: 07/09/18 12:55 PM   Result Value Ref Range    Sodium 138 133 - 144 mmol/L    Potassium 3.2 (L) 3.4 - 5.3 mmol/L    Chloride 105 94 - 109 mmol/L    Carbon Dioxide 27 20 - 32 mmol/L    Anion Gap 6 3 - 14 mmol/L    Glucose 93 70 - 99 mg/dL    Urea Nitrogen 8 7 - 30 mg/dL    Creatinine 0.93 0.66 - 1.25 mg/dL    GFR Estimate >90 >60 mL/min/1.7m2    GFR Estimate If Black >90 >60 mL/min/1.7m2    Calcium 8.0 (L) 8.5 - 10.1 mg/dL    Bilirubin Total 0.4 0.2 - 1.3 mg/dL    Albumin 3.6 3.4 - 5.0 g/dL    Protein Total 6.7 (L) 6.8 - 8.8 g/dL    Alkaline Phosphatase 97 40 - 150 U/L    ALT 31 0 - 70 U/L    AST 41 0 - 45 U/L   Alcohol ethyl     Status: Abnormal    Collection Time: 07/09/18 12:55 PM   Result Value Ref Range    Ethanol g/dL 0.29 (H) <0.01 g/dL   Salicylate level     Status: None    Collection Time: 07/09/18 12:55 PM   Result Value Ref Range    Salicylate Level <2 mg/dL   Acetaminophen level     Status: None    Collection Time: 07/09/18 12:55 PM   Result Value Ref Range    Acetaminophen Level <2 mg/L   Magnesium     Status: Abnormal    Collection Time: 07/09/18 12:55 PM   Result Value Ref Range    Magnesium 2.4 (H) 1.6 -  2.3 mg/dL   Troponin I     Status: None    Collection Time: 07/09/18 12:55 PM   Result Value Ref Range    Troponin I ES <0.015 0.000 - 0.045 ug/L   Drug abuse screen urine     Status: Abnormal    Collection Time: 07/09/18  1:20 PM   Result Value Ref Range    Amphetamine Qual Urine Positive (A) NEG^Negative    Barbiturates Qual Urine Negative NEG^Negative    Benzodiazepine Qual Urine Negative NEG^Negative    Cannabinoids Qual Urine Positive (A) NEG^Negative    Cocaine Qual Urine Negative NEG^Negative    Opiates Qualitative Urine Negative NEG^Negative    PCP Qual Urine Negative NEG^Negative   Laceration repair     Status: None    Collection Time: 07/09/18  8:01 PM    Narrative    Juancho Meza MD     7/9/2018  8:01 PM  Laceration repair  Date/Time: 7/9/2018 1:30 PM  Performed by: JUANCHO MEZA  Authorized by: JUANCHO MEZA   Body area: upper extremity  Location details: left lower arm  Laceration length: 4 cm  Foreign bodies: no foreign bodies  Tendon involvement: none  Nerve involvement: none  Vascular damage: no  Anesthesia: local infiltration    Anesthesia:  Local Anesthetic: lidocaine 1% without epinephrine  Anesthetic total: 10 mL    Sedation:  Patient sedated: no  Irrigation solution: saline  Irrigation method: syringe  Amount of cleaning: extensive  Debridement: none  Degree of undermining: none  Skin closure: 4-0 nylon and Ethilon  Number of sutures: 14  Technique: simple and running  Approximation: close  Approximation difficulty: simple  Dressing: antibiotic ointment  Patient tolerance: Patient tolerated the procedure well with no immediate   complications         ED MEDICATIONS:   Medications   LORazepam (ATIVAN) injection 1 mg (1 mg Intravenous Given 7/9/18 1323)   sodium chloride 0.9 % 1,000 mL with INFUVITE 10 mL, thiamine 100 mg, folic acid 1 mg infusion ( Intravenous New Bag 7/9/18 1430)   potassium chloride 10 mEq in 100 mL intermittent infusion with 10 mg lidocaine (0 mEq Intravenous Stopped  7/9/18 1530)   diphenhydrAMINE (BENADRYL) injection 50 mg (50 mg Intravenous Given 7/9/18 1320)   OLANZapine (zyPREXA) injection 10 mg (10 mg Intramuscular Given 7/9/18 1305)   ketamine (KETALAR) (HIGH CONC) inj 75 mg (75 mg Intravenous Given 7/9/18 1310)         Impression:    ICD-10-CM    1. Agitation requiring sedation protocol R45.1    2. Disruptive behavior disorder F91.9    3. Polysubstance abuse F19.10    4. Alcoholic intoxication without complication (H) F10.920    5. Acute drug intoxication with delirium (H) F19.921    6. Violent behavior R45.6    7. Laceration of arm, left, initial encounter S41.112A Laceration repair     Laceration repair   8. Hypokalemia E87.6        Plan:    Discharge as per above      Jay Jay Huizar Daniel Eugene,   07/09/18 2026

## 2018-07-09 NOTE — ED NOTES
Bed: ED05  Expected date:   Expected time:   Means of arrival:   Comments:  Hold for [facility]

## 2018-07-09 NOTE — ED AVS SNAPSHOT
[facility] Emergency Department    [address] [address]    Phone:  [phone]    Fax:  [phone]                                       Fish Dickerson   MRN: [id number]    Department:  [facility] Emergency Department   Date of Visit:  7/9/2018           After Visit Summary Signature Page     I have received my discharge instructions, and my questions have been answered. I have discussed any challenges I see with this plan with the nurse or doctor.    ..........................................................................................................................................  Patient/Patient Representative Signature      ..........................................................................................................................................  Patient Representative Print Name and Relationship to Patient    ..................................................               ................................................  Date                                            Time    ..........................................................................................................................................  Reviewed by Signature/Title    ...................................................              ..............................................  Date                                                            Time
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
## 2018-07-09 NOTE — MR AVS SNAPSHOT
After Visit Summary   7/9/2018    Fish Dickerson    MRN: [id number]           Patient Information     Date Of Birth          1995        Visit Information        Provider Department      7/9/2018 11:30 AM [place] [facility] RN [facility]        Today's Diagnoses     Laceration of left upper extremity, initial encounter    -  1       Follow-ups after your visit        Future tests that were ordered for you today     Open Standing Orders        Priority Remaining Interval Expires Ordered    End tidal CO2 STAT 60/62 4 TIMES DAILY RT  7/9/2018            Who to contact     If you have questions or need follow up information about today's clinic visit or your schedule please contact [facility] directly at [phone].  Normal or non-critical lab and imaging results will be communicated to you by MyChart, letter or phone within 4 business days after the clinic has received the results. If you do not hear from us within 7 days, please contact the clinic through MyChart or phone. If you have a critical or abnormal lab result, we will notify you by phone as soon as possible.  Submit refill requests through [employer] or call your pharmacy and they will forward the refill request to us. Please allow 3 business days for your refill to be completed.          Additional Information About Your Visit        Care EveryWhere ID     This is your Care EveryWhere ID. This could be used by other organizations to access your [place] medical records  [id number]         Blood Pressure from Last 3 Encounters:   07/09/18 105/61   05/01/18 116/56   06/25/16 123/67    Weight from Last 3 Encounters:   04/30/18 142 lb (64.4 kg)   05/14/14 132 lb 3.2 oz (60 kg) (24 %)*   11/16/11 101 lb 9.6 oz (46.1 kg) (8 %)*     * Growth percentiles are based on CDC 2-20 Years data.              Today, you had the following     No orders found for display       Primary Care Provider Office Phone # Fax #    Kathryn Santo  [phone]       [facility] [address] [facility] [address]        Equal Access to Services     ELLEJAYESH ASIF : Barrera kaylee art itz Tracey, rachelleda jeremiahqnevaeh, patyta kamarthada bere, sylvia lawsonroxy merlyn. So [facility] [phone].    ATENCIÓN: Si habla español, tiene a montez disposición servicios gratuitos de asistencia lingüística. Llame al [phone].    We comply with applicable federal civil rights laws and [place] laws. We do not discriminate on the basis of race, color, national origin, age, disability, sex, sexual orientation, or gender identity.            Thank you!     Thank you for choosing [facility]  for your care. Our goal is always to provide you with excellent care. Hearing back from our patients is one way we can continue to improve our services. Please take a few minutes to complete the written survey that you may receive in the mail after your visit with us. Thank you!             Your Updated Medication List - Protect others around you: Learn how to safely use, store and throw away your medicines at www.disposemymeds.org.          This list is accurate as of 7/9/18  4:27 PM.  Always use your most recent med list.                   Brand Name Dispense Instructions for use Diagnosis    AMBIEN PO      Take 10 mg by mouth At Bedtime        PROZAC PO      Take 40 mg by mouth daily        * SEROQUEL PO      Take 300 mg by mouth At Bedtime        * SEROQUEL PO      Take 200 mg by mouth 2 times daily B&C reports that pt takes it in the AM and 1200.        * Notice:  This list has 2 medication(s) that are the same as other medications prescribed for you. Read the directions carefully, and ask your doctor or other care provider to review them with you.

## 2018-07-10 NOTE — ED NOTES
"Pt's Mom, Mariya Dickerson, called ER after police presented at her door.  Mom states she is not able nor willing to present to ER.  She states that pt called her 2 days ago and said he was kicked out the home he was living in with his girlfriend, he wanted to move back in with his Mom.  Mom refused \"I told him this has to stop and I'm not doing it anymore. He has so many drug, alcohol and psychiatric problems and I have young kids in the house. I am not doing this with him anymore\".  Mom states she does not know where pt has been living the last 2 days.      Mom would like to discuss POC with MD.  Dr. Moran is updated.  Mariya Dickerson [phone]  "

## 2018-07-10 NOTE — ED NOTES
Pt pulled IV out from (R) hand. VSS Pt given DC paperwork and signed receipt. Belongings given to him. Friend here, Daniela, to provide transportation. Destination unknown.

## 2018-07-10 NOTE — ED NOTES
Pt is now awake, using urinal.  He is cooperative at this time. He would like to speak with MD.  Dr. Norris Huizar updated

## 2018-07-10 NOTE — ED NOTES
Clean clothes given to pt. Clothes worn at time of admission saturated in blood. [place], food and beverage provided. Pt states he has not eaten in 2 days.
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
## 2018-07-10 NOTE — DISCHARGE INSTRUCTIONS
I recommend inpatient chemical dependency care.  Opportunity is available at [facility] in [facility].   If you have any thoughts of hurting yourself or others, thoughts of suicide please notify friends and family and return to the emergency room.

## 2020-03-19 ENCOUNTER — RECORDS - HEALTHEAST (OUTPATIENT)
Dept: LAB | Facility: CLINIC | Age: 25
End: 2020-03-19

## 2020-03-19 LAB
ALBUMIN SERPL-MCNC: 4.1 G/DL (ref 3.5–5)
ALP SERPL-CCNC: 69 U/L (ref 45–120)
ALT SERPL W P-5'-P-CCNC: 23 U/L (ref 0–45)
ANION GAP SERPL CALCULATED.3IONS-SCNC: 11 MMOL/L (ref 5–18)
AST SERPL W P-5'-P-CCNC: 24 U/L (ref 0–40)
BILIRUB SERPL-MCNC: 0.3 MG/DL (ref 0–1)
BUN SERPL-MCNC: 14 MG/DL (ref 8–22)
CALCIUM SERPL-MCNC: 9 MG/DL (ref 8.5–10.5)
CHLORIDE BLD-SCNC: 102 MMOL/L (ref 98–107)
CO2 SERPL-SCNC: 26 MMOL/L (ref 22–31)
CREAT SERPL-MCNC: 0.82 MG/DL (ref 0.7–1.3)
GFR SERPL CREATININE-BSD FRML MDRD: >60 ML/MIN/1.73M2
GLUCOSE BLD-MCNC: 88 MG/DL (ref 70–125)
POTASSIUM BLD-SCNC: 4.3 MMOL/L (ref 3.5–5)
PROT SERPL-MCNC: 7.1 G/DL (ref 6–8)
SODIUM SERPL-SCNC: 139 MMOL/L (ref 136–145)

## 2020-06-26 ENCOUNTER — RECORDS - HEALTHEAST (OUTPATIENT)
Dept: LAB | Facility: CLINIC | Age: 25
End: 2020-06-26

## 2020-06-26 LAB — HIV 1+2 AB+HIV1 P24 AG SERPL QL IA: NEGATIVE

## 2020-06-27 LAB — T PALLIDUM AB SER QL: NEGATIVE

## 2020-06-30 LAB
C TRACH DNA SPEC QL PROBE+SIG AMP: NEGATIVE
N GONORRHOEA DNA SPEC QL NAA+PROBE: NEGATIVE

## 2020-09-01 ENCOUNTER — RECORDS - HEALTHEAST (OUTPATIENT)
Dept: LAB | Facility: CLINIC | Age: 25
End: 2020-09-01

## 2020-09-01 LAB
TSH SERPL DL<=0.005 MIU/L-ACNC: 1.11 UIU/ML (ref 0.3–5)
TSH SERPL DL<=0.005 MIU/L-ACNC: 1.2 UIU/ML (ref 0.3–5)

## 2021-05-31 VITALS — WEIGHT: 149 LBS | HEIGHT: 71 IN | BODY MASS INDEX: 20.86 KG/M2

## 2021-06-04 ENCOUNTER — RECORDS - HEALTHEAST (OUTPATIENT)
Dept: LAB | Facility: CLINIC | Age: 26
End: 2021-06-04

## 2021-06-07 LAB — HCV AB SERPL QL IA: NEGATIVE

## 2021-06-14 NOTE — PROGRESS NOTES
[facility] Follow Up Note    Assessment/Plan:  1. Abnormal LFTs  Patient currently is on Suboxone and needs clearance for further adjustment of Suboxone dose.  He had history of previous IV heroin abuse, methamphetamine abuse, alcohol abuse but reports that he has been sober in the last 5 months.  She is on several psychiatric medications.  Recent blood work done at his psychiatrist's office showed elevated alk phos at 111 with the rest of LFTs normal and normal BMP.  Will repeat liver functions today again.  Check thyroid function and bone alkaline phosphatase.  Due to his risk factors are recommended hepatitis B vaccination but we will check his immune status first.  - Hepatic Profile  - Thyroid Stimulating Hormone (TSH)  - Iron and Transferrin Iron Binding Capacity  - Antinuclear Antibody (MIGUEL) Cascade  - Bone Alkaline Phosphatase  - Hepatitis B Surface Antibody (Anti-HBs)    2. Depression With Anxiety  He is currently in residential treatment    3. Vitamin D deficiency  - Vitamin D, Total (25-Hydroxy)    4. Incomplete emptying of bladder  Patient is on Suboxone and we discussed that constipation and Suboxone can make emptying of bladder difficult.  Patient reports that this is started several months after he was put on Suboxone.  He is taking a laxative for constipation.  Denies any dysuria.  I recommended urinalysis and STD screening but patient is unable to leave a sample.  He will come back to leave us a urine sample.  I recommended that he see his urologist if symptoms do not improve.  - Urinalysis-UC if Indicated; Future  - Chlamydia trachomatis & Neisseria gonorrhoeae, Amplified Detection; Future      Ernestina Shelby MD    Chief Complaint:  Chief Complaint   Patient presents with     Establish [facility]     Need labs for med mgmt       History of Present Illness:  Fish is a 22 y.o. male with history of ADD, depression anxiety, history of alcohol, opioid, cannabis and methamphetamine abuse who  is currently here to meet me for the first time and have repeat liver function test done.    Patient reports that he is currently in residential treatment and has been on Suboxone and psychiatric medications.  Recent liver function tests were abnormal and that is why he is here.  He wants to have it rechecked and cleared so further Suboxone adjustments can be made.  We did request results from his clinic and blood work shows alkaline phosphatase at 111, AST and ALT were normal BMP was normal.  Currently patient reports that he has been sober from alcohol and drugs.  In the past he used to inject heroin.  She also has a history of methamphetamine abuse.  Patient refused STD screening and hepatitis screening because reports that he has not been doing anything and in the past screening was negative.  Last HIV and hepatitis screen in 2016 was negative.  Patient was also hospitalized in August due to suicidal ideation and was off medication at that time.  He was started on Prozac and Seroquel.  He reports no changes in his medications recently except for a rating of Ambien.  He denies any history of abdominal pain or liver problems.  Currently he smokes half a pack a day but does not drink any alcohol.  Previous blood work also revealed elevated TSH in 2015.    Patient also has problems emptying his bladder.  It started several weeks ago.  He has been on Suboxone and has been constipated longer than that.  He is unable to give us urine sample today.  He is sexually active with the same partner.  I recommended urinalysis and STD screening and patient will stop by to have it done.  Referral to urology of was also provided.    Review of Systems:  A comprehensive review of systems was performed and was otherwise negative    PFSH:  Social History: Reviewed, patient dropped out of high school but did do high school equivalence through GED testing.  Currently he receives in the residential treatment.  History   Smoking Status  "    Current Every Day Smoker   Smokeless Tobacco     Never Used     Social History     Social History Narrative       Past History: Reviewed  Current Outpatient Prescriptions   Medication Sig Dispense Refill     buPROPion (WELLBUTRIN XL) 300 MG 24 hr tablet Take 300 mg by mouth daily.       FLUoxetine (PROZAC) 20 MG capsule Take 60 mg by mouth daily.       hydrOXYzine (ATARAX) 50 MG tablet Take 50 mg by mouth as needed.       QUEtiapine (SEROQUEL) 400 MG tablet Take 400 mg by mouth at bedtime.       buprenorphine-naloxone (SUBOXONE) 2-0.5 mg Subl per sublingual tablet Place 1 tablet under the tongue daily.       QUETIAPINE FUMARATE (SEROQUEL ORAL) Take by mouth.       zolpidem (AMBIEN) 10 mg tablet Take 5 mg by mouth at bedtime.       No current facility-administered medications for this visit.        Family History: Reviewed    Physical Exam:    Vitals:    11/22/17 1616   BP: 114/76   Patient Site: Left Arm   Patient Position: Sitting   Cuff Size: Adult Regular   Pulse: 94   Resp: 16   SpO2: 98%   Weight: 149 lb (67.6 kg)   Height: 5' 11\" (1.803 m)     Wt Readings from Last 3 Encounters:   11/22/17 149 lb (67.6 kg)   12/01/14 140 lb (63.5 kg) (33 %, Z= -0.44)*   06/22/11 99 lb (44.9 kg) (9 %, Z= -1.31)*     * Growth percentiles are based on [facility] 2-20 Years data.     Body mass index is 20.78 kg/(m^2).    Constitutional:  Reveals a pleasant young male but very fidgety and jittery, poor eye contact, so processes linear, no flight of ideas or pressured speech..  Vitals:  Per nursing notes.  HEENT:No cervical LAD, no thyromegaly,  conjunctiva is pink, no scleral icterus, TMs are visualized and normal bl, oropharynx is clear, no exudates,   Cardiac:  Regular rate and rhythm,no murmurs, rubs, or gallops.  Legs without edema. Palpation of the radial pulse regular.  Lungs: Clear to auscultation bl.  Respiratory effort normal.  Abdomen:positive BS, soft, nontender, nondistended.  No hepato-splenomagaly  Skin:   Without rash, " bruise, or palpable lesions.  Numerous tattoos noted, also scars from cutting on his forearms.  Rheumatologic: Normal joints and nails of the hands.  Neurologic:  Cranial nerves II-XII intact.     Psychiatric: affect somewhat flattened, patient has moderate anxiety, please see constitutional description.     Data Review:    Analysis and Summary of Old Records (2): Yes care everywhere    Records Requested (1): Yes from his psychologist office    Other History Summarized (from other people in the room) (2): No    Radiology Tests Summarized (XRAY/CT/MRI/DXA) (1): No    Labs Reviewed (1): Yes    Medicine Tests Reviewed (EKG/ECHO/COLONOSCOPY/EGD) (1): No    Independent Review of EKG or X-RAY (2): No

## 2021-07-03 NOTE — ADDENDUM NOTE
Addendum Note by Darrick Choi LPN at 11/28/2017  3:26 PM     Author: Darrick Choi LPN Service: -- Author Type: Licensed Nurse    Filed: 11/28/2017  3:26 PM Encounter Date: 11/24/2017 Status: Signed    : Darrick Choi LPN (Licensed Nurse)    Addended by: DARRICK CHOI on: 11/28/2017 03:26 PM        Modules accepted: Orders

## 2021-07-03 NOTE — ADDENDUM NOTE
Addendum Note by Silvana Shelby MD at 11/28/2017  3:57 PM     Author: Silvana Shelby MD Service: -- Author Type: Physician    Filed: 11/28/2017  3:57 PM Encounter Date: 11/24/2017 Status: Signed    : Silvana Shelby MD (Physician)    Addended by: SILVANA SHELBY on: 11/28/2017 03:57 PM        Modules accepted: Orders

## 2022-04-01 ENCOUNTER — HOSPITAL ENCOUNTER (OUTPATIENT)
Dept: CARDIOLOGY | Facility: HOSPITAL | Age: 27
Discharge: HOME | End: 2022-04-01
Attending: PHYSICIAN ASSISTANT | Admitting: PHYSICIAN ASSISTANT
Payer: COMMERCIAL

## 2022-04-01 DIAGNOSIS — R00.2 PALPITATIONS: ICD-10-CM

## 2022-04-01 PROCEDURE — 93270 REMOTE 30 DAY ECG REV/REPORT: CPT

## 2022-05-12 PROCEDURE — 93272 ECG/REVIEW INTERPRET ONLY: CPT | Performed by: INTERNAL MEDICINE

## 2024-02-21 ENCOUNTER — LAB REQUISITION (OUTPATIENT)
Dept: LAB | Facility: CLINIC | Age: 29
End: 2024-02-21

## 2024-02-21 DIAGNOSIS — R39.89 OTHER SYMPTOMS AND SIGNS INVOLVING THE GENITOURINARY SYSTEM: ICD-10-CM

## 2024-02-21 PROCEDURE — 80069 RENAL FUNCTION PANEL: CPT | Performed by: STUDENT IN AN ORGANIZED HEALTH CARE EDUCATION/TRAINING PROGRAM

## 2024-02-22 LAB
ALBUMIN SERPL BCG-MCNC: 4.3 G/DL (ref 3.5–5.2)
ANION GAP SERPL CALCULATED.3IONS-SCNC: 13 MMOL/L (ref 7–15)
BUN SERPL-MCNC: 17.3 MG/DL (ref 6–20)
CALCIUM SERPL-MCNC: 9 MG/DL (ref 8.6–10)
CHLORIDE SERPL-SCNC: 101 MMOL/L (ref 98–107)
CREAT SERPL-MCNC: 0.86 MG/DL (ref 0.67–1.17)
DEPRECATED HCO3 PLAS-SCNC: 28 MMOL/L (ref 22–29)
EGFRCR SERPLBLD CKD-EPI 2021: >90 ML/MIN/1.73M2
GLUCOSE SERPL-MCNC: 107 MG/DL (ref 70–99)
PHOSPHATE SERPL-MCNC: 4.5 MG/DL (ref 2.5–4.5)
POTASSIUM SERPL-SCNC: 3.9 MMOL/L (ref 3.4–5.3)
SODIUM SERPL-SCNC: 142 MMOL/L (ref 135–145)